# Patient Record
Sex: FEMALE | Race: WHITE | NOT HISPANIC OR LATINO | Employment: PART TIME | URBAN - NONMETROPOLITAN AREA
[De-identification: names, ages, dates, MRNs, and addresses within clinical notes are randomized per-mention and may not be internally consistent; named-entity substitution may affect disease eponyms.]

---

## 2022-06-21 ENCOUNTER — HOSPITAL ENCOUNTER (EMERGENCY)
Facility: HOSPITAL | Age: 22
Discharge: HOME | End: 2022-06-21
Attending: EMERGENCY MEDICINE | Admitting: EMERGENCY MEDICINE

## 2022-06-21 VITALS
WEIGHT: 214 LBS | HEIGHT: 66 IN | BODY MASS INDEX: 34.39 KG/M2 | TEMPERATURE: 100.3 F | SYSTOLIC BLOOD PRESSURE: 133 MMHG | OXYGEN SATURATION: 96 % | HEART RATE: 104 BPM | DIASTOLIC BLOOD PRESSURE: 78 MMHG | RESPIRATION RATE: 19 BRPM

## 2022-06-21 DIAGNOSIS — U07.1 COVID-19: Primary | ICD-10-CM

## 2022-06-21 LAB
B PARAPERT DNA SPEC QL NAA+PROBE: NOT DETECTED
B PERT DNA SPEC QL NAA+PROBE: NOT DETECTED
C PNEUM DNA NPH QL NAA+NON-PROBE: NOT DETECTED
FLUAV SUBTYP SPEC NAA+PROBE: NOT DETECTED
FLUBV RNA ISLT QL NAA+PROBE: NOT DETECTED
HADV DNA SPEC NAA+PROBE: NOT DETECTED
HCOV 229E RNA SPEC QL NAA+PROBE: NOT DETECTED
HCOV HKU1 RNA SPEC QL NAA+PROBE: NOT DETECTED
HCOV NL63 RNA SPEC QL NAA+PROBE: NOT DETECTED
HCOV OC43 RNA SPEC QL NAA+PROBE: NOT DETECTED
HMPV RNA NPH QL NAA+NON-PROBE: NOT DETECTED
HPIV1 RNA ISLT QL NAA+PROBE: NOT DETECTED
HPIV2 RNA SPEC QL NAA+PROBE: NOT DETECTED
HPIV3 RNA NPH QL NAA+PROBE: NOT DETECTED
HPIV4 P GENE NPH QL NAA+PROBE: NOT DETECTED
M PNEUMO IGG SER IA-ACNC: NOT DETECTED
RHINOVIRUS RNA SPEC NAA+PROBE: NOT DETECTED
RSV RNA NPH QL NAA+NON-PROBE: NOT DETECTED
S PYO AG THROAT QL: NEGATIVE
SARS-COV-2 RNA NPH QL NAA+NON-PROBE: DETECTED

## 2022-06-21 PROCEDURE — 87147 CULTURE TYPE IMMUNOLOGIC: CPT

## 2022-06-21 PROCEDURE — 99283 EMERGENCY DEPT VISIT LOW MDM: CPT

## 2022-06-21 PROCEDURE — 87880 STREP A ASSAY W/OPTIC: CPT

## 2022-06-21 PROCEDURE — 87081 CULTURE SCREEN ONLY: CPT

## 2022-06-21 PROCEDURE — 0202U NFCT DS 22 TRGT SARS-COV-2: CPT

## 2022-06-21 RX ORDER — IBUPROFEN 800 MG/1
800 TABLET ORAL ONCE
Status: COMPLETED | OUTPATIENT
Start: 2022-06-21 | End: 2022-06-21

## 2022-06-21 RX ORDER — GUAIFENESIN/DEXTROMETHORPHAN 100-10MG/5
5 SYRUP ORAL ONCE
Status: COMPLETED | OUTPATIENT
Start: 2022-06-21 | End: 2022-06-21

## 2022-06-21 RX ADMIN — IBUPROFEN 800 MG: 800 TABLET ORAL at 14:23

## 2022-06-21 RX ADMIN — GUAIFENESIN AND DEXTROMETHORPHAN 5 ML: 100; 10 SYRUP ORAL at 14:24

## 2022-06-21 NOTE — ED PROVIDER NOTES
Subjective   Patient is a 21-year-old female here today with cough, chills, and body aches.  She states that this started yesterday.  Also experiencing sore throat, some nausea, fatigue, and a headache.  Intermittent cough that is nonproductive.  Denies shortness of breath or chest pain.  Thinks that she might have COVID.  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  Continues to have her taste and smell.  She took the first 2 doses of Moderna but did not get a booster.        Review of Systems   Constitutional: Negative for fever.   HENT: Negative for congestion, ear pain, postnasal drip, sinus pressure, sinus pain and sneezing.    Respiratory: Negative for shortness of breath.    Cardiovascular: Negative for chest pain.   Gastrointestinal: Negative for abdominal pain, diarrhea and vomiting.   Genitourinary: Negative for dysuria and flank pain.   Musculoskeletal: Negative for back pain.   Neurological: Negative for dizziness and light-headedness.       No past medical history on file.    No Known Allergies    No past surgical history on file.    No family history on file.    Social History     Socioeconomic History   • Marital status: Single           Objective   Physical Exam  Vitals and nursing note reviewed.   Constitutional:       Appearance: Normal appearance.   HENT:      Head: Normocephalic and atraumatic.      Right Ear: Tympanic membrane, ear canal and external ear normal.      Left Ear: Tympanic membrane, ear canal and external ear normal.      Nose: Nose normal.      Mouth/Throat:      Mouth: Mucous membranes are moist.      Pharynx: Oropharynx is clear.   Eyes:      Extraocular Movements: Extraocular movements intact.      Conjunctiva/sclera: Conjunctivae normal.      Pupils: Pupils are equal, round, and reactive to light.   Cardiovascular:      Rate and Rhythm: Normal rate and regular rhythm.      Pulses: Normal pulses.      Heart sounds: Normal  heart sounds.   Pulmonary:      Effort: Pulmonary effort is normal.      Breath sounds: Normal breath sounds.   Abdominal:      General: Abdomen is flat. Bowel sounds are normal. There is no distension.      Palpations: Abdomen is soft.      Tenderness: There is no abdominal tenderness.   Musculoskeletal:      Cervical back: Neck supple. No tenderness.      Right lower leg: No edema.      Left lower leg: No edema.   Lymphadenopathy:      Cervical: No cervical adenopathy.   Skin:     General: Skin is warm and dry.      Capillary Refill: Capillary refill takes less than 2 seconds.   Neurological:      General: No focal deficit present.      Mental Status: She is alert and oriented to person, place, and time.   Psychiatric:         Mood and Affect: Mood normal.         Behavior: Behavior normal.         Procedures           ED Course  ED Course as of 06/21/22 1609   Tue Jun 21, 2022   1535 Strep A Ag: Negative [TA]   1536 COVID19(!!): Detected [TA]      ED Course User Index  [TA] Dontae Shafer, APRN                                           MDM  Number of Diagnoses or Management Options  COVID-19  Diagnosis management comments: Patient is a 21-year-old female here today for cough, chills, and body aches.  She does not appear to be in acute distress and vital signs are within normal limits.  No obvious abnormalities noted on exam, see above.  Initial treatment to include viral respiratory panel, strep swab, ibuprofen, and Robitussin-DM.    Patient had some improvement in her symptoms with the oral medications.  She is negative for strep but is positive for COVID-19.    Based on CDC recommendations, day 0 would be yesterday as when the patient's symptoms started.  Day 5 would be Saturday.  Advised her that as long as she is fever free for 24 hours without the use of antipyretics and has improving symptoms, she can return to public as long she wears a mask for another few days.  If she is having worsening symptoms CDC  recommends isolating for a total of 10 days. She can return to the ER for any new or worsening symptoms.       Amount and/or Complexity of Data Reviewed  Clinical lab tests: reviewed and ordered  Tests in the medicine section of CPT®: ordered and reviewed  Discussion of test results with the performing providers: yes  Discuss the patient with other providers: yes    Patient Progress  Patient progress: stable      Final diagnoses:   COVID-19       ED Disposition  ED Disposition     ED Disposition   Discharge    Condition   Stable    Comment   --             Jani Hsieh MD  [address] [place] [address] [phone]    Schedule an appointment as soon as possible for a visit            Medication List      No changes were made to your prescriptions during this visit.          Dontae Shafer, APRN  06/21/22 1604

## 2022-06-21 NOTE — DISCHARGE INSTRUCTIONS
Symptoms started yesterday which is Day 0, you need to isolate for 5 days which would be Saturday. As long as you are fever free for 24 hours without the help of Tylenol or Motrin and are having improvement in your symptoms, you can come out of isolation. Recommend you wear a mask while in public for 5 more days.  If your symptoms persist or worsen when Saturday arrives, recommend isolating for another 5 days.  Use over the counter medications to help with symptoms. Return to the ER for any new or worsening symptoms such as shortness of breath, chest pain, etc.

## 2022-06-22 LAB — BACTERIA SPEC AEROBE CULT: ABNORMAL

## 2022-06-22 RX ORDER — PENICILLIN V POTASSIUM 500 MG/1
500 TABLET ORAL 4 TIMES DAILY
Qty: 40 TABLET | Refills: 0 | Status: SHIPPED | OUTPATIENT
Start: 2022-06-22

## 2022-07-21 ENCOUNTER — HOSPITAL ENCOUNTER (EMERGENCY)
Facility: HOSPITAL | Age: 22
Discharge: HOME | End: 2022-07-21
Attending: EMERGENCY MEDICINE | Admitting: EMERGENCY MEDICINE

## 2022-07-21 VITALS
DIASTOLIC BLOOD PRESSURE: 130 MMHG | HEIGHT: 67 IN | BODY MASS INDEX: 34.53 KG/M2 | SYSTOLIC BLOOD PRESSURE: 157 MMHG | HEART RATE: 131 BPM | TEMPERATURE: 98.8 F | WEIGHT: 220 LBS | OXYGEN SATURATION: 98 % | RESPIRATION RATE: 24 BRPM

## 2022-07-21 DIAGNOSIS — S09.93XA INJURY OF TOOTH, INITIAL ENCOUNTER: Primary | ICD-10-CM

## 2022-07-21 DIAGNOSIS — S69.92XA INJURY TO FINGERNAIL OF LEFT HAND, INITIAL ENCOUNTER: ICD-10-CM

## 2022-07-21 PROCEDURE — 63710000001 ONDANSETRON ODT 4 MG TABLET DISPERSIBLE: Performed by: EMERGENCY MEDICINE

## 2022-07-21 PROCEDURE — 99283 EMERGENCY DEPT VISIT LOW MDM: CPT

## 2022-07-21 RX ORDER — ONDANSETRON 4 MG/1
4 TABLET, ORALLY DISINTEGRATING ORAL EVERY 8 HOURS PRN
Qty: 12 TABLET | Refills: 0 | Status: SHIPPED | OUTPATIENT
Start: 2022-07-21 | End: 2022-08-24 | Stop reason: SDUPTHER

## 2022-07-21 RX ORDER — ACETAMINOPHEN 325 MG/1
325 TABLET ORAL ONCE
Status: COMPLETED | OUTPATIENT
Start: 2022-07-21 | End: 2022-07-21

## 2022-07-21 RX ORDER — ONDANSETRON 4 MG/1
2 TABLET, ORALLY DISINTEGRATING ORAL ONCE
Status: COMPLETED | OUTPATIENT
Start: 2022-07-21 | End: 2022-07-21

## 2022-07-21 RX ORDER — ACETAMINOPHEN 325 MG/1
650 TABLET ORAL ONCE
Status: COMPLETED | OUTPATIENT
Start: 2022-07-21 | End: 2022-07-21

## 2022-07-21 RX ADMIN — ONDANSETRON 2 MG: 4 TABLET, ORALLY DISINTEGRATING ORAL at 22:36

## 2022-07-21 RX ADMIN — ACETAMINOPHEN 325 MG: 325 TABLET ORAL at 22:44

## 2022-07-21 RX ADMIN — ACETAMINOPHEN 650 MG: 325 TABLET ORAL at 22:36
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
## 2022-07-22 NOTE — ED NOTES
Upon assessment, pt had dried blood in hair and on bilateral upper and lower extremities. After a full body assessment, RN unable to find anything but a broken nail on the left middle finger, and front teeth are both chipped.

## 2022-08-24 ENCOUNTER — HOSPITAL ENCOUNTER (EMERGENCY)
Facility: HOSPITAL | Age: 22
Discharge: HOME | End: 2022-08-24
Attending: EMERGENCY MEDICINE | Admitting: EMERGENCY MEDICINE

## 2022-08-24 VITALS
DIASTOLIC BLOOD PRESSURE: 78 MMHG | SYSTOLIC BLOOD PRESSURE: 115 MMHG | TEMPERATURE: 99.2 F | HEART RATE: 88 BPM | WEIGHT: 225 LBS | OXYGEN SATURATION: 97 % | BODY MASS INDEX: 35.31 KG/M2 | RESPIRATION RATE: 18 BRPM | HEIGHT: 67 IN

## 2022-08-24 DIAGNOSIS — R11.10 VOMITING AND DIARRHEA: Primary | ICD-10-CM

## 2022-08-24 DIAGNOSIS — R19.7 VOMITING AND DIARRHEA: Primary | ICD-10-CM

## 2022-08-24 LAB — SARS-COV-2 RNA PNL SPEC NAA+PROBE: NOT DETECTED

## 2022-08-24 PROCEDURE — 87635 SARS-COV-2 COVID-19 AMP PRB: CPT | Performed by: EMERGENCY MEDICINE

## 2022-08-24 PROCEDURE — 99283 EMERGENCY DEPT VISIT LOW MDM: CPT

## 2022-08-24 PROCEDURE — C9803 HOPD COVID-19 SPEC COLLECT: HCPCS | Performed by: EMERGENCY MEDICINE

## 2022-08-24 PROCEDURE — 63710000001 ONDANSETRON ODT 4 MG TABLET DISPERSIBLE: Performed by: EMERGENCY MEDICINE

## 2022-08-24 RX ORDER — LOPERAMIDE HYDROCHLORIDE 2 MG/1
2 CAPSULE ORAL ONCE
Status: COMPLETED | OUTPATIENT
Start: 2022-08-24 | End: 2022-08-24

## 2022-08-24 RX ORDER — ONDANSETRON 4 MG/1
8 TABLET, ORALLY DISINTEGRATING ORAL ONCE
Status: COMPLETED | OUTPATIENT
Start: 2022-08-24 | End: 2022-08-24

## 2022-08-24 RX ORDER — ONDANSETRON 4 MG/1
4 TABLET, ORALLY DISINTEGRATING ORAL EVERY 8 HOURS PRN
Qty: 12 TABLET | Refills: 0 | Status: SHIPPED | OUTPATIENT
Start: 2022-08-24

## 2022-08-24 RX ADMIN — LOPERAMIDE HYDROCHLORIDE 2 MG: 2 CAPSULE ORAL at 12:18

## 2022-08-24 RX ADMIN — ONDANSETRON 8 MG: 4 TABLET, ORALLY DISINTEGRATING ORAL at 12:18

## 2022-08-24 NOTE — ED PROVIDER NOTES
"Subjective   History of Present Illness    Chief Complaint: Nausea vomiting diarrhea  History of Present Illness: 21-year-old female presented with above complaint began this morning, 1 episode of vomiting, had loose stools.  No sick contacts no travel.  Had COVID in June, requesting COVID swab  Onset: This morning  Duration: Persistent  Exacerbating / Alleviating factors: None  Associated symptoms: None      Nurses Notes reviewed and agree, including vitals, allergies, social history and prior medical history.     REVIEW OF SYSTEMS: All systems reviewed and not pertinent unless noted.    Positive for: Nausea vomiting diarrhea    Negative for: Fever chills GI bleeding urinary symptoms, concern for pregnancy, abdominal pain  Review of Systems    History reviewed. No pertinent past medical history.    No Known Allergies    History reviewed. No pertinent surgical history.    History reviewed. No pertinent family history.    Social History     Socioeconomic History   • Marital status: Single           Objective   Physical Exam  /78 (BP Location: Left arm, Patient Position: Sitting)   Pulse 88   Temp 99.2 °F (37.3 °C) (Oral)   Resp 18   Ht 170.2 cm (67\")   Wt 102 kg (225 lb)   LMP 08/21/2022 (Exact Date)   SpO2 97%   BMI 35.24 kg/m²     CONSTITUTIONAL: Well developed, nontoxic healthy-appearing 21-year-old female,  in no acute distress.  VITAL SIGNS: per nursing, reviewed and noted  SKIN: exposed skin with no rashes, ulcerations or petechiae  EYES: Grossly EOMI, no icterus  ENT: Normal voice.  Normal nares.  Moist mucous membranes.  No posterior pharyngeal edema or exudate  RESPIRATORY:  No increased work of breathing. No retractions.  Chest clear to auscultation bilaterally  CARDIOVASCULAR:  regular rate and rhythm, no murmurs.  Good Peripheral pulses. Good cap refill to extremities.   GI: Abdomen soft, nontender, normal bowel sounds. No hernia. No ascites.  MUSCULOSKELETAL: Age appropriate bulk and tone.  " No deformities.  Extremities pink and warm  NEUROLOGIC: Alert, oriented x 3. No gross deficits. GCS 15.   PSYCH: appropriate affect.  : no bladder tenderness or distention, no CVA tenderness      Procedures     No attending physician procedures were performed on this patient.      ED Course                                           MDM  21-year-old female presented for evaluation of associated nausea, 1 episode of vomiting, loose stools.  She is normotensive no tachycardia normal room air saturations 97%.  We will treat symptomatically with antiemetics, recommended over-the-counter Imodium.  Had a COVID swab at her request.  No indications for labs, will discharge home in stable condition supportive care recommendations outpatient follow-up return precautions discussed.  Final diagnoses:   Vomiting and diarrhea       ED Disposition  ED Disposition     ED Disposition   Discharge    Condition   Stable    Comment   --             Jani Hsieh MD  [address] [facility] [address]  [facility] [address] [phone]          [facility] Emergency Department  [address] [facility] [address] [phone]    As needed, If symptoms worsen         Where to Get Your Medications      These medications were sent to [id number] - [place], [place] - [address] [facility] [address] - [phone]  - [phone] FX  [address], [facility] [address]    Phone: [phone]   · ondansetron ODT 4 MG disintegrating tablet        Medication List      No changes were made to your prescriptions during this visit.          Alex Davis, DO  08/28/22 0940

## 2022-08-24 NOTE — DISCHARGE INSTRUCTIONS
Add over the counter imodium 1 tab after each loose bowel movement, for a maximum of 8 tabs daily.   We will contact you with abnormal covid results.  You can also access this information real-time through [facility].

## 2022-09-08 ENCOUNTER — HOSPITAL ENCOUNTER (EMERGENCY)
Facility: HOSPITAL | Age: 22
Discharge: HOME | End: 2022-09-08
Attending: EMERGENCY MEDICINE | Admitting: EMERGENCY MEDICINE

## 2022-09-08 VITALS
HEIGHT: 67 IN | WEIGHT: 210 LBS | BODY MASS INDEX: 32.96 KG/M2 | TEMPERATURE: 99.3 F | HEART RATE: 75 BPM | OXYGEN SATURATION: 100 % | RESPIRATION RATE: 18 BRPM | DIASTOLIC BLOOD PRESSURE: 92 MMHG | SYSTOLIC BLOOD PRESSURE: 137 MMHG

## 2022-09-08 DIAGNOSIS — Z20.822 CLOSE EXPOSURE TO COVID-19 VIRUS: Primary | ICD-10-CM

## 2022-09-08 LAB — SARS-COV-2 RNA PNL SPEC NAA+PROBE: NOT DETECTED

## 2022-09-08 PROCEDURE — C9803 HOPD COVID-19 SPEC COLLECT: HCPCS

## 2022-09-08 PROCEDURE — 99283 EMERGENCY DEPT VISIT LOW MDM: CPT

## 2022-09-08 PROCEDURE — 87635 SARS-COV-2 COVID-19 AMP PRB: CPT | Performed by: PHYSICIAN ASSISTANT

## 2022-09-08 NOTE — DISCHARGE INSTRUCTIONS
You tested negative for COVID-19 today.  You will need to monitor your symptoms, quarantine per CDC guidelines given close exposure.  You can take over-the-counter cough and cold medication.  You may need to retest if symptoms persist, you can visit the [facility] for testing sites and additional information.  Return to the ER for any change, worsening of symptoms, or any additional concerns.

## 2022-09-08 NOTE — ED PROVIDER NOTES
"Subjective   PIT    Patient is a 21-year-old female with no reported past medical history presenting to the ER for evaluation of COVID-like symptoms.  Patient states yesterday she found out her friend tested positive for COVID.  She states upon awaking this morning she felt ill like she did when she had COVID before.  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  She thinks she may have had a fever this morning but is unsure.  She states she had a mild nonproductive cough but denies any significant headache, dizziness, syncope, chest pain, shortness of breath, dysuria, hematuria, or any other symptoms          Review of Systems   Constitutional: Positive for fatigue. Negative for chills and fever.   HENT: Negative.    Eyes: Negative.    Respiratory: Positive for cough. Negative for shortness of breath.    Cardiovascular: Negative.    Gastrointestinal: Negative.    Genitourinary: Negative.    Musculoskeletal: Negative.    Skin: Negative.    Allergic/Immunologic: Negative for immunocompromised state.   Neurological: Negative.    Psychiatric/Behavioral: Negative.        History reviewed. No pertinent past medical history.    No Known Allergies    History reviewed. No pertinent surgical history.    History reviewed. No pertinent family history.    Social History     Socioeconomic History   • Marital status: Single   Tobacco Use   • Smoking status: Never Smoker   • Smokeless tobacco: Never Used   Vaping Use   • Vaping Use: Every day   • Substances: Nicotine, Flavoring   • Devices: Disposable   Substance and Sexual Activity   • Alcohol use: Yes     Comment: socially   • Drug use: Never           Objective   Physical Exam  Vitals and nursing note reviewed.     /98 (BP Location: Left arm, Patient Position: Sitting)   Pulse 83   Temp 99.3 °F (37.4 °C) (Oral)   Resp 18   Ht 170.2 cm (67\")   Wt 95.3 kg (210 lb)   LMP 08/21/2022 (Exact " Date)   SpO2 98%   BMI 32.89 kg/m²     GEN: No acute distress, sitting upright in stretcher.  Awake and alert.  Does not appear septic or toxic.  Head: Normocephalic, atraumatic  Eyes: EOM intact  ENT: Mask in place per protocol  Chest: Nontender to palpation  Cardiovascular: Regular rate and rhythm  Lungs: Clear to auscultation bilaterally without adventitious sounds  Abdomen: Soft, nontender, nondistended, no peritoneal signs, no guarding  Extremities: No edema, normal appearance  Neuro: GCS 15  Psych: Mood and affect are appropriate    Procedures           ED Course  ED Course as of 09/08/22 1438   Thu Sep 08, 2022   1414 COVID19: Not Detected [LA]      ED Course User Index  [LA] Luciana Tinoco PA-C      Lab Results (last 24 hours)     Procedure Component Value Units Date/Time    COVID-19,[facility] IN-HOUSE,Nasal Swab No Transport Media 3-4 HR TAT - Swab, Nasal Cavity [[id number]]  (Normal) Collected: 09/08/22 1331    Specimen: Swab from Nasal Cavity Updated: 09/08/22 1413     COVID19 Not Detected    Narrative:      Fact sheet for providers: https://www.fda.gov/media/751959/download     Fact sheet for patients: https://www.fda.gov/media/986018/download    Test performed by PCR.    Consider negative results in combination with clinical observations, patient history, and epidemiological information.                                             MDM  Number of Diagnoses or Management Options  Close exposure to COVID-19 virus  Diagnosis management comments: On arrival, patient is stable.  She has a temp of 99.3 but is not tachycardic.  Has a mildly elevated blood pressure but is saturating 98% on room air.  Differential could include URI, COVID-19, influenza, other viral illness, and other concerns.  Patient states she just wants a COVID test.  We will order this.    COVID test negative.  Discussed this with patient.  Discussed follow-up, quarantine and strict return precautions.  She verbalized understanding was in  agreement with this plan of care.       Amount and/or Complexity of Data Reviewed  Clinical lab tests: reviewed and ordered  Review and summarize past medical records: yes  Discuss the patient with other providers: yes    Risk of Complications, Morbidity, and/or Mortality  Presenting problems: low  Diagnostic procedures: low  Management options: low    Patient Progress  Patient progress: stable      Final diagnoses:   Close exposure to COVID-19 virus       ED Disposition  ED Disposition     ED Disposition   Discharge    Condition   Stable    Comment   --             Jani Hsieh MD  [address] [place] [address] [phone]    Schedule an appointment as soon as possible for a visit            Medication List      No changes were made to your prescriptions during this visit.          Luciana Tinoco PA-C  09/08/22 0454

## 2023-03-03 ENCOUNTER — HOSPITAL ENCOUNTER (EMERGENCY)
Dept: HOSPITAL 53 - M ED | Age: 23
LOS: 1 days | Discharge: HOME | End: 2023-03-04
Payer: MEDICAID

## 2023-03-03 VITALS — BODY MASS INDEX: 34.12 KG/M2 | WEIGHT: 212.31 LBS | HEIGHT: 66 IN

## 2023-03-03 DIAGNOSIS — O20.0: Primary | ICD-10-CM

## 2023-03-03 DIAGNOSIS — Z3A.09: ICD-10-CM

## 2023-03-04 VITALS — SYSTOLIC BLOOD PRESSURE: 148 MMHG | DIASTOLIC BLOOD PRESSURE: 84 MMHG

## 2023-03-04 LAB
B-HCG SERPL-ACNC: 1962 MIU/ML (ref ?–4.2)
BASOPHILS # BLD AUTO: 0.1 10^3/UL (ref 0–0.2)
BASOPHILS NFR BLD AUTO: 0.5 % (ref 0–1)
BUN SERPL-MCNC: 15 MG/DL (ref 9–23)
CALCIUM SERPL-MCNC: 9.1 MG/DL (ref 8.5–10.1)
CHLORIDE SERPL-SCNC: 104 MMOL/L (ref 98–107)
CO2 SERPL-SCNC: 22 MMOL/L (ref 20–31)
CREAT SERPL-MCNC: 0.57 MG/DL (ref 0.55–1.3)
EOSINOPHIL # BLD AUTO: 0.2 10^3/UL (ref 0–0.5)
EOSINOPHIL NFR BLD AUTO: 1.5 % (ref 0–3)
GFR SERPL CREATININE-BSD FRML MDRD: > 60 ML/MIN/{1.73_M2} (ref 60–?)
GLUCOSE SERPL-MCNC: 88 MG/DL (ref 60–100)
HCT VFR BLD AUTO: 44.4 % (ref 36–47)
HGB BLD-MCNC: 14.5 G/DL (ref 12–15.5)
LYMPHOCYTES # BLD AUTO: 3.8 10^3/UL (ref 1.5–5)
LYMPHOCYTES NFR BLD AUTO: 30.1 % (ref 24–44)
MCH RBC QN AUTO: 30.5 PG (ref 27–33)
MCHC RBC AUTO-ENTMCNC: 32.7 G/DL (ref 32–36.5)
MCV RBC AUTO: 93.5 FL (ref 80–96)
MONOCYTES # BLD AUTO: 1 10^3/UL (ref 0–0.8)
MONOCYTES NFR BLD AUTO: 8 % (ref 2–8)
NEUTROPHILS # BLD AUTO: 7.4 10^3/UL (ref 1.5–8.5)
NEUTROPHILS NFR BLD AUTO: 59 % (ref 36–66)
PLATELET # BLD AUTO: 365 10^3/UL (ref 150–450)
POTASSIUM SERPL-SCNC: 4 MMOL/L (ref 3.5–5.1)
RBC # BLD AUTO: 4.75 10^6/UL (ref 4–5.4)
SODIUM SERPL-SCNC: 137 MMOL/L (ref 136–145)
WBC # BLD AUTO: 12.6 10^3/UL (ref 4–10)

## 2023-03-06 ENCOUNTER — HOSPITAL ENCOUNTER (EMERGENCY)
Dept: HOSPITAL 53 - M ED | Age: 23
LOS: 1 days | Discharge: LEFT BEFORE DISCHARGE | End: 2023-03-07
Payer: MEDICAID

## 2023-03-06 VITALS — DIASTOLIC BLOOD PRESSURE: 86 MMHG | SYSTOLIC BLOOD PRESSURE: 145 MMHG

## 2023-03-06 VITALS — HEIGHT: 66 IN | WEIGHT: 208.12 LBS | BODY MASS INDEX: 33.45 KG/M2

## 2023-03-06 DIAGNOSIS — Z53.21: Primary | ICD-10-CM

## 2023-03-07 LAB
BASOPHILS # BLD AUTO: 0.1 10^3/UL (ref 0–0.2)
BASOPHILS NFR BLD AUTO: 0.6 % (ref 0–1)
EOSINOPHIL # BLD AUTO: 0.1 10^3/UL (ref 0–0.5)
EOSINOPHIL NFR BLD AUTO: 1.3 % (ref 0–3)
HCT VFR BLD AUTO: 42.6 % (ref 36–47)
HGB BLD-MCNC: 14.2 G/DL (ref 12–15.5)
LYMPHOCYTES # BLD AUTO: 3.8 10^3/UL (ref 1.5–5)
LYMPHOCYTES NFR BLD AUTO: 34.3 % (ref 24–44)
MCH RBC QN AUTO: 30.7 PG (ref 27–33)
MCHC RBC AUTO-ENTMCNC: 33.3 G/DL (ref 32–36.5)
MCV RBC AUTO: 92 FL (ref 80–96)
MONOCYTES # BLD AUTO: 1 10^3/UL (ref 0–0.8)
MONOCYTES NFR BLD AUTO: 8.9 % (ref 2–8)
NEUTROPHILS # BLD AUTO: 6 10^3/UL (ref 1.5–8.5)
NEUTROPHILS NFR BLD AUTO: 54.7 % (ref 36–66)
PLATELET # BLD AUTO: 367 10^3/UL (ref 150–450)
RBC # BLD AUTO: 4.63 10^6/UL (ref 4–5.4)
WBC # BLD AUTO: 11 10^3/UL (ref 4–10)